# Patient Record
(demographics unavailable — no encounter records)

---

## 2025-01-29 NOTE — ASSESSMENT
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 BMI 40.5 down to 35.5, on ozempic x 10 months  8.5 months s/p extended panniculectomy.  Doing very well.  Drain site less tethered  Symptomatic (neck pain & painful skin rash/chafing) bilateral macromastia and brachioplasty  Not yet a suitable candidate for BL BBR and BL brachioplasty in setting of active weight loss.   -recommend ongoing pre-op weight loss to optimize post-operative cosmesis for BBR and brachioplasty -pt endorses that should would like to continue to lose more weight before proceeding with surgeries -pt happy with appearance of abdominal/mons scars.  no indication for revision. -all questions were answered -follow up in 6 months, weight/BSA check, breast/BUE measurements and pre-op photos  Photos were taken with patient permission.

## 2025-01-29 NOTE — PHYSICAL EXAM
[de-identified] : unlabored breathing, good inspiratory effort  [de-identified] : well developed overweight female, NAD [de-identified] : bilateral macromastia/gigantomastia with associated ptosis, no active IMF rash [de-identified] : Abdomen is soft, non tender, extended incision is healing well, no erythema. 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

## 2025-01-29 NOTE — DATA REVIEWED
[FreeTextEntry1] : Sonia Accession Number : [id number] Patient:     ESTHER MAJANO   Accession:                             [id number]  Collected Date/Time:                   4/30/2024 13:30 EDT Received Date/Time:                    4/30/2024 14:50 EDT  Surgical Pathology Report - Auth (Verified)  Specimen(s) Submitted 1  Abdominal pannus 2  Pre peritoneum fat  Final Diagnosis 1.  Abdominal pannus (3423 grams): - Skin and subcutaneous fibroadipose tissue showing no significant pathologic changes.   2.  Pre-peritoneum fat: - Fibroadipose tissue showing a rare 2 mm focus of hyalinized fibrosis with calcification, non-specific finding. Verified by: Annalee Hall M.D. (Electronic Signature) Reported on: 05/03/24 16:10 EDT, [facility], One [address] [address] Histology technical [place] [address] Phone: [phone]   Fax: [phone] Detail Level: Detailed

## 2025-01-29 NOTE — ASSESSMENT
[FreeTextEntry1] : 54 yo F s/p MWL after gastric bypass with abdominal skin laxity. BMI 40.5 down to 35.5, on ozempic x 10 months  8.5 months s/p extended panniculectomy.  Doing very well.  Drain site less tethered  Symptomatic (neck pain & painful skin rash/chafing) bilateral macromastia and brachioplasty  Not yet a suitable candidate for BL BBR and BL brachioplasty in setting of active weight loss.   -recommend ongoing pre-op weight loss to optimize post-operative cosmesis for BBR and brachioplasty -pt endorses that should would like to continue to lose more weight before proceeding with surgeries -pt happy with appearance of abdominal/mons scars.  no indication for revision. -all questions were answered -follow up in 6 months, weight/BSA check, breast/BUE measurements and pre-op photos  Photos were taken with patient permission.

## 2025-01-29 NOTE — HISTORY OF PRESENT ILLNESS
[FreeTextEntry1] : 50 yo F with PMHx of HTN, GERD, VAUGHN now resolved, Anemia and morbid obesity who presents today for body contouring consultation. Patient's max weight was 318 lbs prior to R-en-Y gastric bypass by Dr. Toney in 2013. She currently weighs 259 lbs stable for at least 1.5 years. Patient c/o heavy abdominal pannus hanging below pubis requiring constant use of compression garments. Also endorses lower back pain, frequent skin rash in abdominal skin folds treated multiple times by her PMD with medicated and OTC tx. Patient states she has to hold her pannus to use the restroom and is very self conscious about the excess skin causing psychosocial distress. She is also concerned with skin laxity of medial arms but would like to address her abdomen first.    max weight 318 lbs, lowest weight 200 lbs Occupation - CNA Nonsmoker   Denies any h/o DVT, PE or MRSA infections.        Interval hx (8/22/22): Pt here for weight check.  She has only lost 3 lbs, now 254 lbs (BMI 39.8).  She feels discouraged.  Did not see bariatric surgery group for weight loss mgmt.  Interval hx (7/17/23) pt now at 239 lbs, now ozempic x 2-3 months.  She in eager to reach goal weight for abdominal contouring surgery/panniculectomy.  She feels better after initial weight loss.  interval hx (0/13/23); Pt now 227 lbs, on ozempic. Weight loss slowing down.  c/w intertrigo rash below pannus. here to discuss scheduling for panniculectomy  Interval hx (1/19/24):  Pt now at 217 lbs while on ozempic.  Weight stable x 2 months.   still c/o intertrigo rash pannus. r  Interval hx (5/1/24).  Pt presents today POD#1 s/p extended panniculectomy. Doing well overall c/o incisional discomfort and nausea. Denies any f/c or bleeding. Ambulating.   Interval hx (5/8/24).  Pt presents today POD#8 s/p extended panniculectomy. Pt is doing very well, denies f/c or drainage from incision. Wearing binder and monitoring drain output daily, L drain > R. Pt off all pain medication   Interval hx (5/16/24).  Pt presents today POD#16 s/p extended panniculectomy. Doing well with improving incisional discomfort. Denies any f/c or drainage. Drain functional  with minimal drainage. Compliant with abdominal binder.   Interval hx (6/7/24): 5 weeks s/p extended panniculectomy.  Interval hx (7/19/24/):  2.5 months s/p extended panniculectomy.  Pt very happy with results  interval hx (1/27/25): 8.5 months s/p  extended panniculectomy.  Pt very happy with results.  Still on ozempic but on smaller dose.  Still losing weight.    She is very happy with appearance.  She interested in both breast reduction and brachioplasty in her next steps for post-bariatric body contouring surgical mgmt.  No new health issues.  She complains of heavy and large breasts with associated neck, shoulder and upper back pain.  + IMF rashes requiring OTC topical medication mgmt. Also c/o skin chafing of medial arm skin.  Pt reports psychosocial impact of severe batwing deformity with her social interactions and feels self conscience about wearing short-sleeves/ayde tops.

## 2025-01-29 NOTE — PHYSICAL EXAM
[de-identified] : well developed overweight female, NAD [de-identified] : unlabored breathing, good inspiratory effort  [de-identified] : bilateral macromastia/gigantomastia with associated ptosis, no active IMF rash [de-identified] : Abdomen is soft, non tender, extended incision is healing well, no erythema. 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

## 2025-01-29 NOTE — DATA REVIEWED
[FreeTextEntry1] : Sonia Accession Number : [id number] Patient:     ESTHER MAJANO   Accession:                             [id number]  Collected Date/Time:                   4/30/2024 13:30 EDT Received Date/Time:                    4/30/2024 14:50 EDT  Surgical Pathology Report - Auth (Verified)  Specimen(s) Submitted 1  Abdominal pannus 2  Pre peritoneum fat  Final Diagnosis 1.  Abdominal pannus (3423 grams): - Skin and subcutaneous fibroadipose tissue showing no significant pathologic changes.   2.  Pre-peritoneum fat: - Fibroadipose tissue showing a rare 2 mm focus of hyalinized fibrosis with calcification, non-specific finding. Verified by: Annalee Hall M.D. (Electronic Signature) Reported on: 05/03/24 16:10 EDT, [facility], [address] [address] Histology [place] [address] Phone: [phone]   Fax: [phone]